# Patient Record
Sex: MALE | URBAN - METROPOLITAN AREA
[De-identification: names, ages, dates, MRNs, and addresses within clinical notes are randomized per-mention and may not be internally consistent; named-entity substitution may affect disease eponyms.]

---

## 2018-09-28 ENCOUNTER — PATIENT OUTREACH (OUTPATIENT)
Dept: CASE MANAGEMENT | Age: 63
End: 2018-09-28

## 2018-09-28 NOTE — PROGRESS NOTES
Patient is eligible for an establish care visit with PCP, call goes to fast busy and no voicemail set up. Will send letter.

## 2019-09-11 ENCOUNTER — TELEPHONE (OUTPATIENT)
Dept: CASE MANAGEMENT | Age: 64
End: 2019-09-11

## 2021-03-15 DIAGNOSIS — Z23 NEED FOR VACCINATION: ICD-10-CM

## (undated) NOTE — LETTER
September 11, 2019        Kari Henao  [address]      Dear Landen Needle:    [facility] have recently selected or were assigned Dr. Natan Verdin as your primary care provider.  We have been unable  to reach you to schedule an appoi

## (undated) NOTE — LETTER
Denise Archuleta  [address]        Dear Guille Bravo: On behalf of your primary doctor Eleno Cardoso MD, we have attempted to reach you to schedule an establish care appointment.        To provide you with the best possible